# Patient Record
Sex: MALE | Race: OTHER
[De-identification: names, ages, dates, MRNs, and addresses within clinical notes are randomized per-mention and may not be internally consistent; named-entity substitution may affect disease eponyms.]

---

## 2020-03-20 ENCOUNTER — HOSPITAL ENCOUNTER (EMERGENCY)
Dept: HOSPITAL 77 - KA.ED | Age: 20
Discharge: HOME | End: 2020-03-20
Payer: COMMERCIAL

## 2020-03-20 DIAGNOSIS — S61.310A: Primary | ICD-10-CM
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
## 2020-03-20 DIAGNOSIS — Z23: ICD-10-CM

## 2020-03-20 DIAGNOSIS — F17.210: ICD-10-CM

## 2020-03-20 NOTE — CR
______________________________________________________________________________   

  

[id number] RAD/RAD Fingers Right  

Exam: RAD Fingers Right  

   

 Indication:STUCK IN ROLLER.  

   

 Comparison: No prior imaging for comparison.  

   

 Discussion:   

   

 Laceration at the tip of the second digit. No underlying fracture. No retained  

 radiopaque foreign bodies.  

   

 Impression:  

   

 As above.  

   

 Electronically signed by Jayme Lopez MD on 3/20/2020 3:16 PM  

   

  

Jayme Lopez MD                 

 03/20/20 0108    

  

Thank you for allowing us to participate in the care of your patient.

## 2020-03-20 NOTE — EDM.PDOC
ED HPI GENERAL MEDICAL PROBLEM





- General


Chief Complaint: Upper Extremity Injury/Pain


Stated Complaint: INJURED FINGER


Time Seen by Provider: 03/20/20 15:12


Source of Information: Reports: Patient


History Limitations: Reports: No Limitations





- History of Present Illness


INITIAL COMMENTS - FREE TEXT/NARRATIVE: 





18 yo WM presents to ER with right index finger tip injury. Pt reports he got 

his finger caught in a conveyer belt roller and it caused an avulsion injury to 

his right index finger tip. Pt denies any other injuries. Pt reports his last 

tetanus was 2012.  


Onset: Today


Duration: Hour(s): (1)


Location: Reports: Upper Extremity, Right


Quality: Reports: Ache


Severity: Mild


Improves with: Reports: None


Worsens with: Reports: None


Associated Symptoms: Reports: No Other Symptoms


  ** Right Finger-Index


Pain Score (Numeric/FACES): 9





- Related Data


 Allergies











Allergy/AdvReac Type Severity Reaction Status Date / Time


 


No Known Allergies Allergy   Verified 03/20/20 14:43











Home Meds: 


 Home Meds





. [No Known Home Meds]  03/20/20 [History]











Past Medical History





- Past Health History


Medical/Surgical History: Denies Medical/Surgical History





Social & Family History





- Tobacco Use


Smoking Status *Q: Current Every Day Smoker


Years of Tobacco use: 2


Packs/Tins Daily: 0.1





- Recreational Drug Use


Recreational Drug Use: No





Review of Systems





- Review of Systems


Review Of Systems: See Below


Constitutional: Reports: No Symptoms


Eyes: Reports: No Symptoms


Ears: Reports: No Symptoms


Nose: Reports: No Symptoms


Mouth/Throat: Reports: No Symptoms


Respiratory: Reports: No Symptoms


Cardiovascular: Reports: No Symptoms


GI/Abdominal: Reports: No Symptoms


Genitourinary: Reports: No Symptoms


Musculoskeletal: Reports: No Symptoms


Skin: Reports: Wound (superficial right index finger tip injury with partial 

nail avulsion)


Neurological: Reports: No Symptoms


Psychiatric: Reports: No Symptoms





ED EXAM, GENERAL





- Physical Exam


Exam: See Below


Exam Limited By: No Limitations


General Appearance: Alert, WD/WN, No Apparent Distress


Head: Atraumatic, Normocephalic


Neck: Normal Inspection, Supple, Non-Tender, Full Range of Motion


Respiratory/Chest: No Respiratory Distress, Lungs Clear, Normal Breath Sounds, 

No Accessory Muscle Use, Chest Non-Tender


Cardiovascular: Normal Peripheral Pulses, Regular Rate, Rhythm, No Edema, No 

Gallop, No JVD, No Murmur, No Rub


GI/Abdominal: Normal Bowel Sounds, Soft, Non-Tender, No Organomegaly, No 

Distention, No Abnormal Bruit, No Mass


Back Exam: Normal Inspection, Full Range of Motion, NT


Extremities: Normal Range of Motion, No Pedal Edema, Normal Capillary Refill


Neurological: Alert, Oriented, CN II-XII Intact, Normal Cognition, Normal Gait, 

Normal Reflexes, No Motor/Sensory Deficits


Psychiatric: Normal Affect, Normal Mood


Skin Exam: Warm, Dry, Normal Color, No Rash, Wound/Incision (superficial right 

index finger tip injury with partial nail avulsion)





ED TRAUMA EXTREMITY PROCEDURES





- Laceration/Wound Repair


  ** Right Digit - 2nd (Index)


Lac/Wound Length In cm: 2


Appearance: Superficial


Distal NVT: Neuro & Vascular Intact


Anesthetic Type: Digital


Local Anesthesia - Lidocaine (Xylocaine): 1% Plain


Local Anesthetic Volume: 5cc


Skin Prep: Chlorhexidine (Hibiciens), Providone-Iodine (Betadine)


Exploration/Debridement/Repair: Wound Explored


Sterile Dressing Applied: Provider


Tetanus Status Addressed: Yes


Complications: No


Progress/Comments: 





debridement of superficial skin and partial nail avulsion without suturing 





Course





- Vital Signs


Last Recorded V/S: 





 Last Vital Signs











Temp  36.1 C   03/20/20 14:46


 


Pulse  88   03/20/20 14:46


 


Resp  16   03/20/20 14:46


 


BP  129/66   03/20/20 14:46


 


Pulse Ox  99   03/20/20 14:46














- Orders/Labs/Meds


Orders: 





 Active Orders 24 hr











 Category Date Time Status


 


 Fingers Second Digit Rt F6 [CR] Stat Exams  03/20/20 14:45 Ordered














- Radiology Interpretation


Free Text/Narrative:: 





right index finger- NAD





Departure





- Departure


Time of Disposition: 15:35


Disposition: Home, Self-Care 01


Condition: Good


Clinical Impression: 


Nail avulsion, finger


Qualifiers:


 Encounter type: initial encounter Qualified Code(s): S61.309A - Unspecified 

open wound of unspecified finger with damage to nail, initial encounter





Injury of tip of finger of right hand


Qualifiers:


 Encounter type: initial encounter Qualified Code(s): S69.91XA - Unspecified 

injury of right wrist, hand and finger(s), initial encounter








- Discharge Information


Instructions:  Nail Avulsion, Wound Care, Adult


Referrals: 


Eileen Yeboah MD [Primary Care Provider] - 


Additional Instructions: 


1. discharge home


2. wound instructions given


3. keep clean and dry


4. return to ER for worsening symptoms


5. follow up with PCP for further evaluation and treatment








Sepsis Event Note





- Evaluation


Sepsis Screening Result: No Definite Risk





- Focused Exam


Vital Signs: 





 Vital Signs











  Temp Pulse Resp BP Pulse Ox


 


 03/20/20 14:46  36.1 C  88  16  129/66  99











Date Exam was Performed: 03/20/20


Time Exam was Performed: 15:12





- My Orders


Last 24 Hours: 





My Active Orders





03/20/20 14:45


Fingers Second Digit Rt F6 [CR] Stat 














- Assessment/Plan


Last 24 Hours: 





My Active Orders





03/20/20 14:45


Fingers Second Digit Rt F6 [CR] Stat 











Assessment:: 





1. superficial right index finger tip injury with partial nail avulsion


Plan: 





1. discharge home


2. wound instructions given


3. keep clean and dry


4. return to ER for worsening symptoms


5. follow up with PCP for further evaluation and treatment